# Patient Record
Sex: FEMALE | URBAN - METROPOLITAN AREA
[De-identification: names, ages, dates, MRNs, and addresses within clinical notes are randomized per-mention and may not be internally consistent; named-entity substitution may affect disease eponyms.]

---

## 2018-07-09 ENCOUNTER — APPOINTMENT (RX ONLY)
Dept: URBAN - METROPOLITAN AREA CLINIC 151 | Facility: CLINIC | Age: 48
Setting detail: DERMATOLOGY
End: 2018-07-09

## 2018-07-09 DIAGNOSIS — L57.0 ACTINIC KERATOSIS: ICD-10-CM

## 2018-07-09 DIAGNOSIS — L82.1 OTHER SEBORRHEIC KERATOSIS: ICD-10-CM

## 2018-07-09 DIAGNOSIS — D22 MELANOCYTIC NEVI: ICD-10-CM

## 2018-07-09 PROBLEM — D22.5 MELANOCYTIC NEVI OF TRUNK: Status: ACTIVE | Noted: 2018-07-09

## 2018-07-09 PROBLEM — J30.1 ALLERGIC RHINITIS DUE TO POLLEN: Status: ACTIVE | Noted: 2018-07-09

## 2018-07-09 PROCEDURE — 99203 OFFICE O/P NEW LOW 30 MIN: CPT | Mod: 25

## 2018-07-09 PROCEDURE — 17000 DESTRUCT PREMALG LESION: CPT

## 2018-07-09 PROCEDURE — ? LIQUID NITROGEN

## 2018-07-09 PROCEDURE — ? COUNSELING

## 2018-07-09 ASSESSMENT — LOCATION SIMPLE DESCRIPTION DERM
LOCATION SIMPLE: RIGHT THIGH
LOCATION SIMPLE: NOSE
LOCATION SIMPLE: LEFT LOWER BACK
LOCATION SIMPLE: RIGHT UPPER BACK

## 2018-07-09 ASSESSMENT — LOCATION DETAILED DESCRIPTION DERM
LOCATION DETAILED: RIGHT SUPERIOR UPPER BACK
LOCATION DETAILED: NASAL TIP
LOCATION DETAILED: RIGHT ANTERIOR DISTAL THIGH
LOCATION DETAILED: LEFT INFERIOR MEDIAL MIDBACK

## 2018-07-09 ASSESSMENT — LOCATION ZONE DERM
LOCATION ZONE: LEG
LOCATION ZONE: NOSE
LOCATION ZONE: TRUNK

## 2019-07-09 ENCOUNTER — APPOINTMENT (RX ONLY)
Dept: URBAN - METROPOLITAN AREA CLINIC 151 | Facility: CLINIC | Age: 49
Setting detail: DERMATOLOGY
End: 2019-07-09

## 2019-07-09 DIAGNOSIS — D22 MELANOCYTIC NEVI: ICD-10-CM

## 2019-07-09 DIAGNOSIS — Z71.89 OTHER SPECIFIED COUNSELING: ICD-10-CM

## 2019-07-09 DIAGNOSIS — L81.4 OTHER MELANIN HYPERPIGMENTATION: ICD-10-CM

## 2019-07-09 PROBLEM — D22.5 MELANOCYTIC NEVI OF TRUNK: Status: ACTIVE | Noted: 2019-07-09

## 2019-07-09 PROBLEM — D22.39 MELANOCYTIC NEVI OF OTHER PARTS OF FACE: Status: ACTIVE | Noted: 2019-07-09

## 2019-07-09 PROCEDURE — 99214 OFFICE O/P EST MOD 30 MIN: CPT

## 2019-07-09 PROCEDURE — ? COUNSELING

## 2019-07-09 ASSESSMENT — LOCATION SIMPLE DESCRIPTION DERM
LOCATION SIMPLE: RIGHT FOREARM
LOCATION SIMPLE: LEFT UPPER BACK
LOCATION SIMPLE: LEFT CHEEK
LOCATION SIMPLE: RIGHT CHEEK
LOCATION SIMPLE: ABDOMEN
LOCATION SIMPLE: LEFT FOREARM

## 2019-07-09 ASSESSMENT — LOCATION ZONE DERM
LOCATION ZONE: TRUNK
LOCATION ZONE: FACE
LOCATION ZONE: ARM

## 2019-07-09 ASSESSMENT — LOCATION DETAILED DESCRIPTION DERM
LOCATION DETAILED: LEFT DISTAL DORSAL FOREARM
LOCATION DETAILED: EPIGASTRIC SKIN
LOCATION DETAILED: LEFT INFERIOR CENTRAL MALAR CHEEK
LOCATION DETAILED: LEFT INFERIOR UPPER BACK
LOCATION DETAILED: RIGHT CENTRAL MALAR CHEEK
LOCATION DETAILED: RIGHT PROXIMAL DORSAL FOREARM
LOCATION DETAILED: LEFT LATERAL MALAR CHEEK
LOCATION DETAILED: RIGHT INFERIOR CENTRAL MALAR CHEEK

## 2019-08-13 NOTE — HPI: SKIN LESION
Chief Complaint   Patient presents with   71 [facility]   The history is provided by the patient (Had a stanley friend hang with me for awhile. located on my legs, arms, mid back). Chronicity: started friday. Episode frequency: biting me at night. I cant see what is biting me. She has tried nothing for the symptoms. Visit Vitals  /74   Pulse (!) 104   Temp 98.5 °F (36.9 °C)   Resp 20   Ht 5' (1.524 m)   Wt 144 lb (65.3 kg)   SpO2 97%   BMI 28.12 kg/m²         Physical Exam   Constitutional: She is oriented to person, place, and time. She appears well-developed and well-nourished. Musculoskeletal: Normal range of motion. Neurological: She is alert and oriented to person, place, and time. Skin: Skin is warm and dry. Quarter size welt noted to the patient's right upper side    Psychiatric: She has a normal mood and affect. Her behavior is normal. Judgment and thought content normal.         Current Outpatient Medications on File Prior to Visit   Medication Sig Dispense Refill    indapamide (LOZOL) 1.25 mg tablet Take 1 Tab by mouth daily. 30 Tab 6    rosuvastatin (CRESTOR) 5 mg tablet Take 1 Tab by mouth nightly. 90 Tab 3    Blood-Glucose Meter monitoring kit Use as directed to check blood glucose levels daily 1 Kit 0    glipiZIDE (GLUCOTROL) 5 mg tablet TAKE 1 TABLET BY MOUTH THREE TIMES DAILY BEFORE  BREAKFAST,  LUNCH  AND  DINNER 90 Tab 6    metFORMIN (GLUCOPHAGE) 500 mg tablet Take 1 Tab by mouth three (3) times daily (with meals). 60 Tab 6    aspirin (ALMAZ CHEWABLE ASPIRIN) 81 mg chewable tablet Take 81 mg by mouth daily.       metFORMIN (GLUCOPHAGE) 500 mg tablet TAKE 1 TABLET BY MOUTH THREE TIMES DAILY WITH MEALS 60 Tab 6    glucose blood VI test strips (BLOOD GLUCOSE TEST) strip Use as directed to check blood glucose daily 50 Strip 11    lancets misc Use as directed to check blood glucose levels 1 Each 11     No current facility-administered medications on file
How Severe Is Your Skin Lesion?: moderate
Has Your Skin Lesion Been Treated?: not been treated
prior to visit. 1. Bedbug bite, initial encounter  If you suspect an infestation, remove all bedding and check it carefully for signs of the bugs or their excrement. Remove the dust cover over the bottom of the box springs and examine the seams in the wood framing. Peel back the fabric where it is stapled to the wood frame. Also, check the area around the bed, including inside books, telephones or radios, the edge of the carpet, and even in electrical outlets. Check your closet, because bedbugs can attach to clothing. If you are uncertain about signs of bedbugs, call an , who will know what to look for. Bedbug Treatments  Getting rid of bedbugs begins with cleaning up the places where bedbugs live. This should include the following:  Clean bedding, linens, curtains, and clothing in hot water and dry them on the highest dryer setting. Place stuffed animals, shoes, and other items that can't be washed in the dryer and run on high for 30 minutes. Use a stiff brush to scrub mattress seams to remove bedbugs and their eggs before vacuuming. Vacuum your bed and surrounding area frequently. After vacuuming, immediately place the vacuum  bag in a plastic bag and place in garbage can outdoors. Encase mattress and box springs with a tightly woven, zippered cover to keep bedbugs from entering or escaping. Bedbugs may live up to a year without feeding, so keep the cover on your mattress for at least a year to make sure all bugs in the mattress are dead. Repair cracks in plaster and glue down peeling wallpaper to get rid of places bedbugs can hide. Get rid of clutter around the bed. If your mattress is infested, you may want to get rid of it and get a new one, but take care to rid the rest of your home of bedbugs or they will infest your new mattress. Instructed pt that there is no treatment for bed bug bites except to rid his home of these bugs.  Pt can shower in luke water to help with
Is This A New Presentation, Or A Follow-Up?: Skin Lesion
the itching and to keep open areas clean. 2. Itching  Encouraged pt to take benadryl as prescribed to help with the itch. Unable to prescribe vistaril and 2nd generation antihistamine (Claritine) due to known allergies. - diphenhydrAMINE (BENADRYL ALLERGY) 25 mg tablet; Take 2 Tabs by mouth every six (6) hours as needed for Itching. For allergic reaction  Dispense: 30 Tab;  Refill: 0      Face to Face time: 20 min
How Severe Is Your Skin Lesion?: mild

## 2019-09-30 ENCOUNTER — APPOINTMENT (RX ONLY)
Dept: URBAN - METROPOLITAN AREA CLINIC 151 | Facility: CLINIC | Age: 49
Setting detail: DERMATOLOGY
End: 2019-09-30

## 2019-09-30 DIAGNOSIS — L57.0 ACTINIC KERATOSIS: ICD-10-CM

## 2019-09-30 DIAGNOSIS — L82.1 OTHER SEBORRHEIC KERATOSIS: ICD-10-CM

## 2019-09-30 PROCEDURE — ? COUNSELING

## 2019-09-30 PROCEDURE — 17003 DESTRUCT PREMALG LES 2-14: CPT

## 2019-09-30 PROCEDURE — ? LIQUID NITROGEN

## 2019-09-30 PROCEDURE — 99213 OFFICE O/P EST LOW 20 MIN: CPT | Mod: 25

## 2019-09-30 PROCEDURE — 17000 DESTRUCT PREMALG LESION: CPT

## 2019-09-30 ASSESSMENT — LOCATION DETAILED DESCRIPTION DERM
LOCATION DETAILED: LEFT ANTERIOR PROXIMAL THIGH
LOCATION DETAILED: LEFT INFERIOR LATERAL MALAR CHEEK
LOCATION DETAILED: RIGHT INFERIOR MEDIAL MALAR CHEEK

## 2019-09-30 ASSESSMENT — LOCATION SIMPLE DESCRIPTION DERM
LOCATION SIMPLE: RIGHT CHEEK
LOCATION SIMPLE: LEFT CHEEK
LOCATION SIMPLE: LEFT THIGH

## 2019-09-30 ASSESSMENT — LOCATION ZONE DERM
LOCATION ZONE: FACE
LOCATION ZONE: LEG